# Patient Record
Sex: FEMALE | URBAN - METROPOLITAN AREA
[De-identification: names, ages, dates, MRNs, and addresses within clinical notes are randomized per-mention and may not be internally consistent; named-entity substitution may affect disease eponyms.]

---

## 2021-04-30 ENCOUNTER — OFFICE VISIT (OUTPATIENT)
Dept: URBAN - METROPOLITAN AREA CLINIC 13 | Facility: CLINIC | Age: 61
End: 2021-04-30
Payer: COMMERCIAL

## 2021-04-30 DIAGNOSIS — H35.052 RETINAL NEOVASCULARIZATION, UNSPECIFIED, LEFT EYE: ICD-10-CM

## 2021-04-30 DIAGNOSIS — Z96.1 PRESENCE OF INTRAOCULAR LENS: ICD-10-CM

## 2021-04-30 DIAGNOSIS — H20.9 UVEITIS: ICD-10-CM

## 2021-04-30 DIAGNOSIS — H43.811 VITREOUS DETACHMENT OF RIGHT EYE: ICD-10-CM

## 2021-04-30 DIAGNOSIS — H35.81 RETINAL EDEMA: ICD-10-CM

## 2021-04-30 PROCEDURE — 99214 OFFICE O/P EST MOD 30 MIN: CPT | Performed by: OPHTHALMOLOGY

## 2021-04-30 PROCEDURE — 92134 CPTRZ OPH DX IMG PST SGM RTA: CPT | Performed by: OPHTHALMOLOGY

## 2021-04-30 ASSESSMENT — INTRAOCULAR PRESSURE
OD: 14
OS: 13

## 2021-04-30 NOTE — IMPRESSION/PLAN
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 Scarring from CNV will limit her visual potential OS. Uncertain etiology of vision loss OD. OCT - no fluid.

## 2021-04-30 NOTE — IMPRESSION/PLAN
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 Plan: Stable, CNV inactive.   Scarring will limit visual potential

## 2021-04-30 NOTE — IMPRESSION/PLAN
Impression: Other vitreous opacities, right eye: H43.391. Right. Plan: --retina remains attached and fully intact   
--vit debris negatively affecting ADLs such as reading and driving  
--surgical intervention was offered --r/b/a of PPV discussed, inc bleeding, pain, infection, loss of vision --pt elects to proceed with surgery SURGICAL PLAN: 25G PPV/IVK OD

## 2021-05-11 ENCOUNTER — SURGERY (OUTPATIENT)
Dept: URBAN - METROPOLITAN AREA EXTERNAL CLINIC 26 | Facility: EXTERNAL CLINIC | Age: 61
End: 2021-05-11
Payer: COMMERCIAL

## 2021-05-11 PROCEDURE — 67036 REMOVAL OF INNER EYE FLUID: CPT | Performed by: OPHTHALMOLOGY

## 2021-05-12 ENCOUNTER — POST-OPERATIVE VISIT (OUTPATIENT)
Dept: URBAN - METROPOLITAN AREA CLINIC 54 | Facility: CLINIC | Age: 61
End: 2021-05-12
Payer: COMMERCIAL

## 2021-05-12 PROCEDURE — 99024 POSTOP FOLLOW-UP VISIT: CPT | Performed by: OPHTHALMOLOGY

## 2021-05-12 ASSESSMENT — INTRAOCULAR PRESSURE
OD: 22
OS: 17

## 2021-05-12 NOTE — IMPRESSION/PLAN
Impression: S/P 25G PPV/IVK OD - 1 Day. Other vitreous opacities, right eye  H43.391. Excellent post op course   Post operative instructions reviewed - Condition is improving - Plan: No s/s of RD/infection VA/IOP acceptable Post-operative instructions and precautions Reviewed. 
Call ASAP with changes
--Continue Ocuflox QID--Continue PF QID

1 week POS ()

## 2021-05-18 ENCOUNTER — POST-OPERATIVE VISIT (OUTPATIENT)
Dept: URBAN - METROPOLITAN AREA CLINIC 13 | Facility: CLINIC | Age: 61
End: 2021-05-18
Payer: COMMERCIAL

## 2021-05-18 DIAGNOSIS — H43.391 OTHER VITREOUS OPACITIES, RIGHT EYE: Primary | ICD-10-CM

## 2021-05-18 PROCEDURE — 99024 POSTOP FOLLOW-UP VISIT: CPT | Performed by: OPHTHALMOLOGY

## 2021-05-18 ASSESSMENT — INTRAOCULAR PRESSURE
OD: 12
OS: 10

## 2021-05-18 NOTE — IMPRESSION/PLAN
Impression: S/P 25G PPV/IVK OD - 7 Days. Other vitreous opacities, right eye  H43.391. Excellent post op course   Post operative instructions reviewed - Condition is improving - Plan: No s/s of RD/infection VA/IOP acceptable Post-operative instructions and precautions Reviewed. Call ASAP with changes --Taper Prednisolone acetate 1% TID x 1 wk, BID x 1wk, QD x 1wk, then d/c
--Discontinue Ocuflox 1 month POS/OCT ()

## 2021-06-25 ENCOUNTER — POST-OPERATIVE VISIT (OUTPATIENT)
Dept: URBAN - METROPOLITAN AREA CLINIC 13 | Facility: CLINIC | Age: 61
End: 2021-06-25
Payer: COMMERCIAL

## 2021-06-25 PROCEDURE — 92134 CPTRZ OPH DX IMG PST SGM RTA: CPT | Performed by: OPHTHALMOLOGY

## 2021-06-25 PROCEDURE — 99024 POSTOP FOLLOW-UP VISIT: CPT | Performed by: OPHTHALMOLOGY

## 2021-06-25 ASSESSMENT — INTRAOCULAR PRESSURE
OD: 16
OS: 13

## 2021-06-25 NOTE — IMPRESSION/PLAN
Impression: S/P 25G PPV/IVK OD - 45 Days. Other vitreous opacities, right eye  H43.391.  Excellent post op course   Post operative instructions reviewed - Condition is improving - Plan: --retina attached
--no s/s infection
--IOP acceptable
--RD/endoph WS discussed
--OCT shows good contour, no ERM/CME

RTC 12 months DFE/OCT OU

## 2022-06-24 ENCOUNTER — OFFICE VISIT (OUTPATIENT)
Dept: URBAN - METROPOLITAN AREA CLINIC 13 | Facility: CLINIC | Age: 62
End: 2022-06-24
Payer: COMMERCIAL

## 2022-06-24 DIAGNOSIS — E11.3393 TYPE 2 DIAB WITH MOD NONP RTNOP WITHOUT MACULAR EDEMA, BI: ICD-10-CM

## 2022-06-24 DIAGNOSIS — Z96.1 PRESENCE OF INTRAOCULAR LENS: ICD-10-CM

## 2022-06-24 DIAGNOSIS — H43.811 VITREOUS DETACHMENT OF RIGHT EYE: ICD-10-CM

## 2022-06-24 DIAGNOSIS — H35.81 RETINAL EDEMA: ICD-10-CM

## 2022-06-24 DIAGNOSIS — H35.052 RETINAL NEOVASCULARIZATION, UNSPECIFIED, LEFT EYE: ICD-10-CM

## 2022-06-24 DIAGNOSIS — H43.391 OTHER VITREOUS OPACITIES, RIGHT EYE: ICD-10-CM

## 2022-06-24 DIAGNOSIS — H20.9 UVEITIS: Primary | ICD-10-CM

## 2022-06-24 PROCEDURE — 92014 COMPRE OPH EXAM EST PT 1/>: CPT | Performed by: OPHTHALMOLOGY

## 2022-06-24 PROCEDURE — 92134 CPTRZ OPH DX IMG PST SGM RTA: CPT | Performed by: OPHTHALMOLOGY

## 2022-06-24 ASSESSMENT — INTRAOCULAR PRESSURE
OS: 14
OD: 16

## 2022-06-24 NOTE — IMPRESSION/PLAN
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 Scarring from CNV will limit her visual potential OS. OCT - no fluid OU. Overall doing as well as she can. Pt to call with s/s dec VA/pain/floaters. 

RTC 12 months for DFE/OCT OU

## 2022-06-24 NOTE — IMPRESSION/PLAN
Impression: Other vitreous opacities, right eye  H43.391. S/P 25G PPV/IVK OD 05/11/2021 Plan: Resolved floaters OD. Pt happy with result.

## 2022-06-24 NOTE — IMPRESSION/PLAN
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 Plan: Stable. Cont BS/BP control.   Last A1C 7